# Patient Record
Sex: MALE | Race: WHITE
[De-identification: names, ages, dates, MRNs, and addresses within clinical notes are randomized per-mention and may not be internally consistent; named-entity substitution may affect disease eponyms.]

---

## 2018-04-25 ENCOUNTER — HOSPITAL ENCOUNTER (OUTPATIENT)
Dept: HOSPITAL 17 - HSDC | Age: 58
Discharge: HOME | End: 2018-04-25
Attending: SURGERY
Payer: COMMERCIAL

## 2018-04-25 VITALS
DIASTOLIC BLOOD PRESSURE: 95 MMHG | SYSTOLIC BLOOD PRESSURE: 144 MMHG | HEART RATE: 55 BPM | RESPIRATION RATE: 18 BRPM | TEMPERATURE: 97.5 F | OXYGEN SATURATION: 97 %

## 2018-04-25 VITALS — BODY MASS INDEX: 30.27 KG/M2 | WEIGHT: 228.4 LBS | HEIGHT: 73 IN

## 2018-04-25 DIAGNOSIS — K42.9: Primary | ICD-10-CM

## 2018-04-25 DIAGNOSIS — R94.31: ICD-10-CM

## 2018-04-25 LAB
BASOPHILS # BLD AUTO: 0 TH/MM3 (ref 0–0.2)
BASOPHILS NFR BLD: 0.9 % (ref 0–2)
BUN SERPL-MCNC: 17 MG/DL (ref 7–18)
CALCIUM SERPL-MCNC: 8.1 MG/DL (ref 8.5–10.1)
CHLORIDE SERPL-SCNC: 112 MEQ/L (ref 98–107)
CREAT SERPL-MCNC: 1.3 MG/DL (ref 0.6–1.3)
EOSINOPHIL # BLD: 0.1 TH/MM3 (ref 0–0.4)
EOSINOPHIL NFR BLD: 2.2 % (ref 0–4)
ERYTHROCYTE [DISTWIDTH] IN BLOOD BY AUTOMATED COUNT: 14 % (ref 11.6–17.2)
GFR SERPLBLD BASED ON 1.73 SQ M-ARVRAT: 56 ML/MIN (ref 89–?)
GLUCOSE SERPL-MCNC: 98 MG/DL (ref 74–106)
HCO3 BLD-SCNC: 24.7 MEQ/L (ref 21–32)
HCT VFR BLD CALC: 42.8 % (ref 39–51)
HGB BLD-MCNC: 14.6 GM/DL (ref 13–17)
LYMPHOCYTES # BLD AUTO: 1.4 TH/MM3 (ref 1–4.8)
LYMPHOCYTES NFR BLD AUTO: 31.8 % (ref 9–44)
MCH RBC QN AUTO: 29.2 PG (ref 27–34)
MCHC RBC AUTO-ENTMCNC: 34 % (ref 32–36)
MCV RBC AUTO: 85.9 FL (ref 80–100)
MONOCYTE #: 0.4 TH/MM3 (ref 0–0.9)
MONOCYTES NFR BLD: 10.3 % (ref 0–8)
NEUTROPHILS # BLD AUTO: 2.3 TH/MM3 (ref 1.8–7.7)
NEUTROPHILS NFR BLD AUTO: 54.8 % (ref 16–70)
PLATELET # BLD: 192 TH/MM3 (ref 150–450)
PMV BLD AUTO: 8.9 FL (ref 7–11)
RBC # BLD AUTO: 4.98 MIL/MM3 (ref 4.5–5.9)
SODIUM SERPL-SCNC: 144 MEQ/L (ref 136–145)
WBC # BLD AUTO: 4.3 TH/MM3 (ref 4–11)

## 2018-04-25 PROCEDURE — 85025 COMPLETE CBC W/AUTO DIFF WBC: CPT

## 2018-04-25 PROCEDURE — 00750 ANES HRNA RPR UPR ABD NOS: CPT

## 2018-04-25 PROCEDURE — C9290 INJ, BUPIVACAINE LIPOSOME: HCPCS

## 2018-04-25 PROCEDURE — C1781 MESH (IMPLANTABLE): HCPCS

## 2018-04-25 PROCEDURE — C1727 CATH, BAL TIS DIS, NON-VAS: HCPCS

## 2018-04-25 PROCEDURE — 80048 BASIC METABOLIC PNL TOTAL CA: CPT

## 2018-04-25 PROCEDURE — 93005 ELECTROCARDIOGRAM TRACING: CPT

## 2018-04-25 PROCEDURE — 49652: CPT

## 2018-04-25 NOTE — HHI.PR
__________________________________________________





Immediate Post Op Note


Procedure Date:


Apr 25, 2018


Pre Op Diagnosis:  


umbilical hernia


Post Op Diagnosis:  


same


Surgeon:


Felix Aadn MD


Assistant(s):


see or sheet


Procedure:


lap umbilical hernia repair


Findings:


incarcerated omental fat


Complications:


none


Specimen(s) removed:


none


Estimated blood loss:


5cc


Anesthesia:  General


Drains:  None


Patient to:  PACU


Patient Condition:  Good











Felix Adan MD Apr 25, 2018 09:06

## 2018-04-25 NOTE — EKG
Date Performed: 04/25/2018       Time Performed: 06:20:52

 

PTAGE:      58 years

 

EKG:      Sinus rhythm 

 

 LEFT ANTERIOR FASCICULAR BLOCK ABNORMAL ECG 

 

NO PREVIOUS TRACING            

 

DOCTOR:   Kaushik Oleary  Interpretating Date/Time  04/25/2018 08:45:26

## 2018-04-26 NOTE — MP
cc:

Felix Adan MD, Lars S MD

****

 

 

DATE OF OPERATION:

04/25/2018

 

 

PREOPERATIVE DIAGNOSIS:

Umbilical hernia, ventral hernia.

 

POSTOPERATIVE DIAGNOSIS:

Umbilical hernia.

 

PROCEDURE PERFORMED:

Laparoscopic umbilical hernia repair.

 

SURGEON:

Felix Adan MD

 

ASSISTANT:

See anesthesia sheet.

 

ANESTHESIA:

GETA.

 

IV FLUIDS:

See anesthesia sheet.

 

ESTIMATED BLOOD LOSS:

5 mL

 

DRAINS:

None

 

COMPLICATIONS:

None.

 

WOUND CLASSIFICATION:

Clean

 

FINDINGS:

Umbilical hernia with incarcerated omental fat.

 

SPECIMENS:

None.

 

INDICATION:

The patient is a 58-year-old male who presents with a longstanding 

umbilical hernia.  He notes reducible, but not fully and has 

discomfort with this.  Decision was made for elective ventral hernia 

repair of umbilical hernia.

 

DETAILS OF PROCEDURE:

The patient was taken to the operating suite, placed in supine 

position. He was prepped and draped in the usual sterile fashion after

induction of general endotracheal anesthesia.  Brief timeout done 

stating correct patient, procedure, surgical site.  We were all in 

agreement with this.  Attention first directed to the left upper 

quadrant, where a small stab nick incision was made after injection of

local anesthetic, Exparel.  The Visiport 5 mm port was used to enter 

the abdomen safely.  Abdomen was insufflated to 15 mm 

pneumoperitoneum.  On cursory inspection, no evidence of injury, two 

other ports placed, one left lower quadrant and one left mid quadrant 

port.  These were also 5 mm ports and local anesthetic was injected.  

Observation noted the umbilical hernia with some incarcerated omental 

fat.

 

Electro Bovie cautery was used to take down the omental fat and 

dissect and resect the hernia sac.  This was removed from the abdomen.

 The ligamentum teres cephalad was taken down in order to get adequate

peritoneal purchase of mesh. Distally minimal fat was taken down as 

well.  Once this was done appropriately, the mesh was obtained which 

was  thin core 12 x 12 mesh. Four Warsaw sutures were placed at the 12 

o'clock, 3 o'clock, 6 o'clock, and 9 o'clock positions.  The mesh was 

then rolled and placed in the abdomen through the 5 mm port hole and 

unraveled.  A small pack of 3-0 Vicryl was placed in the center of the

mesh in order to parachute the mesh and bring it anterior to the 

abdominal wall.  Suture passer was used at all sites including the 12 

o'clock, 3 o'clock, 6 o'clock, and 9 o'clock sites.  Small stab nick 

incision was made.  Suture fascia was placed, grasped a single strand 

of the Warsaw suture and brought into the operative field.  The second 

stent was also brought and these were the transfascial sutures.  Once 

this was done in all 4 places, the mesh was noted to sit taut on the 

anterior abdominal wall and placed adequately.  The sutures were tied 

in place with minimal tension and then a suture tacker was used to 

reinforce the mesh.  Following this, the omentum was placed back in 

its anatomical position.  The abdomen was desufflated.  The ports were

removed.  The incisions were closed with 4-0 Monocryl suture and 

sterile dressings were placed.  Exparel placed to all transfascial 

sites along with the port sites as well.  All lap and instrument 

counts were correct at the end of the procedure.  No complication.  

The patient was extubated and taken stable to PACU.

 

 

 

 

__________________________________

MD KINGS Beyer/

D: 04/26/2018, 05:38 PM

T: 04/26/2018, 07:36 PM

Visit #: [id number]

Job #: [id number]